# Patient Record
Sex: MALE | Race: WHITE | NOT HISPANIC OR LATINO | Employment: STUDENT | URBAN - METROPOLITAN AREA
[De-identification: names, ages, dates, MRNs, and addresses within clinical notes are randomized per-mention and may not be internally consistent; named-entity substitution may affect disease eponyms.]

---

## 2019-12-12 ENCOUNTER — OFFICE VISIT (OUTPATIENT)
Dept: URGENT CARE | Facility: CLINIC | Age: 24
End: 2019-12-12
Payer: COMMERCIAL

## 2019-12-12 VITALS
WEIGHT: 185 LBS | DIASTOLIC BLOOD PRESSURE: 67 MMHG | OXYGEN SATURATION: 100 % | HEIGHT: 68 IN | HEART RATE: 68 BPM | SYSTOLIC BLOOD PRESSURE: 127 MMHG | RESPIRATION RATE: 16 BRPM | TEMPERATURE: 98 F | BODY MASS INDEX: 28.04 KG/M2

## 2019-12-12 DIAGNOSIS — B02.9 HERPES ZOSTER WITHOUT COMPLICATION: Primary | ICD-10-CM

## 2019-12-12 PROCEDURE — 99203 OFFICE O/P NEW LOW 30 MIN: CPT | Performed by: PHYSICIAN ASSISTANT

## 2019-12-12 RX ORDER — FAMCICLOVIR 500 MG/1
500 TABLET, FILM COATED ORAL 3 TIMES DAILY
Qty: 21 TABLET | Refills: 0 | Status: SHIPPED | OUTPATIENT
Start: 2019-12-12 | End: 2019-12-12

## 2019-12-12 RX ORDER — MIRTAZAPINE 15 MG/1
TABLET, FILM COATED ORAL
Refills: 0 | COMMUNITY
Start: 2019-10-25

## 2019-12-12 RX ORDER — FAMCICLOVIR 500 MG/1
500 TABLET, FILM COATED ORAL 3 TIMES DAILY
Qty: 21 TABLET | Refills: 0 | Status: SHIPPED | OUTPATIENT
Start: 2019-12-12 | End: 2019-12-19

## 2019-12-12 RX ORDER — SERTRALINE HYDROCHLORIDE 100 MG/1
100 TABLET, FILM COATED ORAL DAILY
Refills: 1 | COMMUNITY
Start: 2019-10-05

## 2019-12-12 NOTE — PROGRESS NOTES
[facility]        NAME: Jan Alonzo is a 30 y o  male  : 1995    MRN: [id number]  DATE: 2019  TIME: 4:21 PM    Assessment and Plan   Herpes zoster without complication [H48 8]  1  Herpes zoster without complication  famciclovir (FAMVIR) 500 mg tablet     No ocular involvement  Patient Instructions     Take Famciclovir as prescribed  Keep affected area covered to avoid transmission   Take Ibuprofen and use lidocaine patches over the counter  Cool compresses over area  Follow up with PCP in 3-5 days  Proceed to  ER if symptoms worsen  Chief Complaint     Chief Complaint   Patient presents with    Herpes Zoster     has a herpes outbreak on his right forehead  Was dx Mat herpes 8 yrs ago         History of Present Illness        Admits to painful rash over R forehead  Patient states he has been more stressed recently due to finals  Review of Systems   Review of Systems   Constitutional: Negative for chills and fever  HENT: Negative for congestion, ear discharge, ear pain, mouth sores, rhinorrhea, sinus pressure, sinus pain, sneezing and sore throat  Respiratory: Negative for cough  Gastrointestinal: Negative for nausea and vomiting  Musculoskeletal: Negative for myalgias  Skin: Positive for rash  Neurological: Negative for headaches           Current Medications       Current Outpatient Medications:     mirtazapine (REMERON) 15 mg tablet, TAKE 1/2 TABLET BY MOUTH EVERY DAY AT BEDTIME, Disp: , Rfl: 0    sertraline (ZOLOFT) 100 mg tablet, Take 100 mg by mouth daily, Disp: , Rfl: 1    famciclovir (FAMVIR) 500 mg tablet, Take 1 tablet (500 mg total) by mouth 3 (three) times a day for 7 days, Disp: 21 tablet, Rfl: 0    Current Allergies     Allergies as of 2019 - never reviewed   Allergen Reaction Noted    E-mycin [erythromycin] GI Intolerance 2019            The following portions of the patient's history were reviewed and updated as appropriate: allergies, current medications, past family history, past medical history, past social history, past surgical history and problem list      Past Medical History:   Diagnosis Date    Anxiety        Past Surgical History:   Procedure Laterality Date    KNEE CARTILAGE SURGERY         Family History   Problem Relation Age of Onset    No Known Problems Mother     No Known Problems Father          Medications have been verified  Objective   /67   Pulse 68   Temp 98 °F (36 7 °C) (Tympanic)   Resp 16   Ht 5' 8" (1 727 m)   Wt 83 9 kg (185 lb)   SpO2 100%   BMI 28 13 kg/m²        Physical Exam     Physical Exam   Constitutional: He appears well-developed and well-nourished  No distress  HENT:   Mouth/Throat: Oropharynx is clear and moist    Cardiovascular: Normal rate, regular rhythm and normal heart sounds  Exam reveals no gallop and no friction rub  No murmur heard  Pulmonary/Chest: Effort normal and breath sounds normal  No respiratory distress  He has no wheezes  He has no rales  He exhibits no tenderness  Lymphadenopathy:     He has no cervical adenopathy  Neurological: He is alert  Skin: Skin is warm  He is not diaphoretic

## 2019-12-12 NOTE — PATIENT INSTRUCTIONS
Take Famciclovir as prescribed  Keep affected area covered to avoid transmission   Take Ibuprofen and use lidocaine patches over the counter  Cool compresses over area  Follow up with PCP in 3-5 days  Proceed to  ER if symptoms worsen  Shingles   WHAT YOU NEED TO KNOW:   Shingles is a painful infection caused by the same virus that causes chickenpox (varicella-zoster virus)  After you get chickenpox, the virus stays in your body for several years without causing any symptoms  Shingles occurs when the virus becomes active again  Once active, the virus will travel along a nerve to your skin and cause a rash  DISCHARGE INSTRUCTIONS:   Return to the emergency department if:   · You have painful, red, warm skin around the blisters, or the blisters drain pus  · Your neck is stiff or you have trouble moving it  · You have trouble moving your arms, legs, or face  · You have a seizure  · You have weakness in an arm or leg  · You become confused, or have difficulty speaking  · You have dizziness, a severe headache, hearing or vision loss  Contact your healthcare provider if:   · You feel weak or have a headache  · You have a cough, chills, or a fever  · You have abdominal pain, nausea, or vomiting  · Your rash becomes more itchy or painful  · Your rash spreads to other parts of your body  · Your pain worsens and does not go away even after taking medicine  · You have questions or concerns about your condition or care  Medicines:   · Antiviral medicine  helps decrease symptoms and healing time  They may also decrease your risk of developing nerve pain  You will need to start taking them within 3 days of the start of symptoms to prevent nerve pain  · Pain medicine  may be prescribed or suggested by your healthcare provider  You may need NSAIDs, acetaminophen, or opioid medicine depending on how much pain you are in   Do not wait until the pain is severe before you take more pain medicine  · Topical anesthetics  are used to numb the skin and decrease pain  They can be a cream, gel, spray, or patch  · Anticonvulsants  decrease nerve pain and may help you sleep at night  · Antidepressants  may be used to decrease nerve pain  · Take your medicine as directed  Contact your healthcare provider if you think your medicine is not helping or if you have side effects  Tell him of her if you are allergic to any medicine  Keep a list of the medicines, vitamins, and herbs you take  Include the amounts, and when and why you take them  Bring the list or the pill bottles to follow-up visits  Carry your medicine list with you in case of an emergency  Follow up with your healthcare provider as directed:  Write down your questions so you remember to ask them during your visits  Self-care:  Keep your rash clean and dry  Cover your rash with a bandage or clothing  Do not use bandages that stick to your skin  The sticky part may irritate your skin and make your rash last longer  Prevent the spread of shingles: The virus can be passed to a person who has never had chickenpox  This person may get chickenpox, but not shingles  You may pass the virus to others as long as you have a rash  The virus is spread by direct contact with the fluid from the blisters  Usually, you cannot spread the virus once the blisters dry up  Prevent shingles or another shingles outbreak:  A vaccine may be given to help prevent shingles  Ask for more information about this vaccine  © 2017 [employer] Information is for End User's use only and may not be sold, redistributed or otherwise used for commercial purposes  All illustrations and images included in CareNotes® are the copyrighted property of [employer]  or [employer]  The above information is an  only  It is not intended as medical advice for individual conditions or treatments   Talk to your doctor, nurse or pharmacist before following any medical regimen to see if it is safe and effective for you

## 2020-02-13 ENCOUNTER — OFFICE VISIT (OUTPATIENT)
Dept: URGENT CARE | Facility: CLINIC | Age: 25
End: 2020-02-13
Payer: COMMERCIAL

## 2020-02-13 VITALS
HEIGHT: 68 IN | DIASTOLIC BLOOD PRESSURE: 76 MMHG | WEIGHT: 185 LBS | SYSTOLIC BLOOD PRESSURE: 134 MMHG | TEMPERATURE: 98.9 F | BODY MASS INDEX: 28.04 KG/M2 | HEART RATE: 87 BPM | OXYGEN SATURATION: 95 % | RESPIRATION RATE: 16 BRPM

## 2020-02-13 DIAGNOSIS — B34.9 VIRAL ILLNESS: Primary | ICD-10-CM

## 2020-02-13 PROCEDURE — 99214 OFFICE O/P EST MOD 30 MIN: CPT | Performed by: PHYSICIAN ASSISTANT

## 2020-02-13 RX ORDER — ONDANSETRON 4 MG/1
4 TABLET, ORALLY DISINTEGRATING ORAL EVERY 6 HOURS PRN
Qty: 20 TABLET | Refills: 0 | Status: SHIPPED | OUTPATIENT
Start: 2020-02-13

## 2020-02-13 RX ORDER — METHYLPREDNISOLONE 4 MG/1
TABLET ORAL
Qty: 1 EACH | Refills: 0 | Status: SHIPPED | OUTPATIENT
Start: 2020-02-13

## 2020-02-13 NOTE — PROGRESS NOTES
[facility]        NAME: Radha Galindo is a 30 y o  male  : 1995    MRN: [id number]  DATE: 2020  TIME: 4:03 PM    Assessment and Plan   Viral illness [B34 9]  1  Viral illness  ondansetron (ZOFRAN-ODT) 4 mg disintegrating tablet    methylPREDNISolone 4 MG tablet therapy pack       Patient Instructions     Take medicine as discussed  Follow up with PCP in 3-5 days  Proceed to  ER if symptoms worsen  Chief Complaint     Chief Complaint   Patient presents with    Generalized Body Aches     c/o headache that started yesterday, also with congestion, generalized body aches and chills x3 days         History of Present Illness       URI    This is a new problem  Episode onset: 3 days  The problem has been unchanged  Maximum temperature: tactile  Associated symptoms include congestion, coughing, nausea, rhinorrhea, sinus pain, a sore throat and swollen glands  Pertinent negatives include no abdominal pain, chest pain, diarrhea, ear pain or wheezing  He has tried acetaminophen and decongestant for the symptoms  The treatment provided mild relief  Review of Systems   Review of Systems   Constitutional: Negative for activity change, appetite change, chills, diaphoresis, fatigue, fever and unexpected weight change  HENT: Positive for congestion, rhinorrhea, sinus pain and sore throat  Negative for ear pain  Respiratory: Positive for cough and chest tightness  Negative for apnea, choking, shortness of breath, wheezing and stridor  Cardiovascular: Negative for chest pain, palpitations and leg swelling  Gastrointestinal: Positive for nausea  Negative for abdominal pain and diarrhea           Current Medications       Current Outpatient Medications:     mirtazapine (REMERON) 15 mg tablet, TAKE 1/2 TABLET BY MOUTH EVERY DAY AT BEDTIME, Disp: , Rfl: 0    sertraline (ZOLOFT) 100 mg tablet, Take 100 mg by mouth daily, Disp: , Rfl: 1    famciclovir (FAMVIR) 500 mg tablet, Take 1 tablet (500 mg total) by mouth 3 (three) times a day for 7 days, Disp: 21 tablet, Rfl: 0    methylPREDNISolone 4 MG tablet therapy pack, Use as directed on package, Disp: 1 each, Rfl: 0    ondansetron (ZOFRAN-ODT) 4 mg disintegrating tablet, Take 1 tablet (4 mg total) by mouth every 6 (six) hours as needed for nausea or vomiting, Disp: 20 tablet, Rfl: 0    Current Allergies     Allergies as of 02/13/2020 - Reviewed 02/13/2020   Allergen Reaction Noted    E-mycin [erythromycin] GI Intolerance 12/12/2019            The following portions of the patient's history were reviewed and updated as appropriate: allergies, current medications, past family history, past medical history, past social history, past surgical history and problem list      Past Medical History:   Diagnosis Date    Anxiety     H/O cold sores        Past Surgical History:   Procedure Laterality Date    KNEE CARTILAGE SURGERY         Family History   Problem Relation Age of Onset    No Known Problems Mother     No Known Problems Father          Medications have been verified  Objective   /76   Pulse 87   Temp 98 9 °F (37 2 °C) (Tympanic)   Resp 16   Ht 5' 8" (1 727 m)   Wt 83 9 kg (185 lb)   SpO2 95%   BMI 28 13 kg/m²        Physical Exam     Physical Exam   Constitutional: He appears well-developed and well-nourished  HENT:   Head: Normocephalic  Right Ear: External ear normal    Left Ear: External ear normal    Nose: Mucosal edema and rhinorrhea present  Mouth/Throat: No oropharyngeal exudate, posterior oropharyngeal edema, posterior oropharyngeal erythema or tonsillar abscesses  Cardiovascular: Normal rate, regular rhythm and normal heart sounds  Exam reveals no gallop and no friction rub  No murmur heard  Pulmonary/Chest: Effort normal and breath sounds normal  No stridor  No respiratory distress  He has no decreased breath sounds  He has no wheezes  He has no rhonchi  He has no rales     Harsh dry cough Abdominal: Soft  Bowel sounds are normal  He exhibits no distension and no mass  There is no tenderness  There is no guarding  Lymphadenopathy:     He has cervical adenopathy  Right cervical: Superficial cervical adenopathy present  Left cervical: Superficial cervical adenopathy present

## 2020-02-13 NOTE — LETTER
February 13, 2020     Patient: Margie Pallas   YOB: 1995   Date of Visit: 2/13/2020       To Whom it May Concern:    Margie Pallas was seen in my clinic on 2/13/2020  He may return to school on 02/17/2020 due to influenza       If you have any questions or concerns, please don't hesitate to call           Sincerely,          Abdoul Albert PA-C        CC: No Recipients

## 2022-08-11 ENCOUNTER — APPOINTMENT (OUTPATIENT)
Dept: LAB | Facility: CLINIC | Age: 27
End: 2022-08-11

## 2022-08-11 ENCOUNTER — OCCMED (OUTPATIENT)
Dept: URGENT CARE | Facility: CLINIC | Age: 27
End: 2022-08-11

## 2022-08-11 DIAGNOSIS — Z02.1 PHYSICAL EXAM, PRE-EMPLOYMENT: ICD-10-CM

## 2022-08-11 DIAGNOSIS — Z02.1 PHYSICAL EXAM, PRE-EMPLOYMENT: Primary | ICD-10-CM

## 2022-08-11 PROCEDURE — 86480 TB TEST CELL IMMUN MEASURE: CPT

## 2022-08-11 PROCEDURE — 86762 RUBELLA ANTIBODY: CPT

## 2022-08-11 PROCEDURE — 36415 COLL VENOUS BLD VENIPUNCTURE: CPT

## 2022-08-11 PROCEDURE — 90715 TDAP VACCINE 7 YRS/> IM: CPT

## 2022-08-11 PROCEDURE — 86787 VARICELLA-ZOSTER ANTIBODY: CPT

## 2022-08-11 PROCEDURE — 86765 RUBEOLA ANTIBODY: CPT

## 2022-08-11 PROCEDURE — 86735 MUMPS ANTIBODY: CPT

## 2022-08-11 NOTE — PROGRESS NOTES
This encounter was created for [facility] orders only   [address] [facility]        NAME: Bruno Schilling is a 27 y o  male  : 1995    MRN: [id number]  DATE: 2022  TIME: 1:13 PM    There were no vitals taken for this visit  Assessment and Plan   Physical exam, pre-employment [Z02 1]  1  Physical exam, pre-employment  Varicella zoster antibody, IgG    Rubella antibody, IgG    Rubeola antibody IgG    Mumps antibody, IgG    Quantiferon TB Gold Plus         Patient Instructions       Follow up with PCP in 3-5 days  Proceed to  ER if symptoms worsen  Chief Complaint   No chief complaint on file          History of Present Illness       HPI    Review of Systems   Review of Systems      Current Medications       Current Outpatient Medications:     famciclovir (FAMVIR) 500 mg tablet, Take 1 tablet (500 mg total) by mouth 3 (three) times a day for 7 days, Disp: 21 tablet, Rfl: 0    methylPREDNISolone 4 MG tablet therapy pack, Use as directed on package, Disp: 1 each, Rfl: 0    mirtazapine (REMERON) 15 mg tablet, TAKE 1/2 TABLET BY MOUTH EVERY DAY AT BEDTIME, Disp: , Rfl: 0    ondansetron (ZOFRAN-ODT) 4 mg disintegrating tablet, Take 1 tablet (4 mg total) by mouth every 6 (six) hours as needed for nausea or vomiting, Disp: 20 tablet, Rfl: 0    sertraline (ZOLOFT) 100 mg tablet, Take 100 mg by mouth daily, Disp: , Rfl: 1    Current Allergies     Allergies as of 2022 - Reviewed 2020   Allergen Reaction Noted    E-mycin [erythromycin] GI Intolerance 2019            The following portions of the patient's history were reviewed and updated as appropriate: allergies, current medications, past family history, past medical history, past social history, past surgical history and problem list      Past Medical History:   Diagnosis Date    Anxiety     H/O cold sores        Past Surgical History:   Procedure Laterality Date    KNEE CARTILAGE SURGERY         Family History   Problem Relation Age of Onset    No Known Problems Mother     No Known Problems Father          Medications have been verified  Objective   There were no vitals taken for this visit         Physical Exam     Physical Exam

## 2022-08-12 LAB
MEV IGG SER QL IA: NORMAL
MUV IGG SER QL IA: ABNORMAL
RUBV IGG SERPL IA-ACNC: 99.9 IU/ML
VZV IGG SER QL IA: NORMAL

## 2022-08-15 LAB
GAMMA INTERFERON BACKGROUND BLD IA-ACNC: 0.03 IU/ML
M TB IFN-G BLD-IMP: NEGATIVE
M TB IFN-G CD4+ BCKGRND COR BLD-ACNC: 0 IU/ML
M TB IFN-G CD4+ BCKGRND COR BLD-ACNC: 0 IU/ML
MITOGEN IGNF BCKGRD COR BLD-ACNC: 2.4 IU/ML

## 2024-06-06 ENCOUNTER — OFFICE VISIT (OUTPATIENT)
Dept: URGENT CARE | Facility: CLINIC | Age: 29
End: 2024-06-06
Payer: COMMERCIAL

## 2024-06-06 VITALS
SYSTOLIC BLOOD PRESSURE: 137 MMHG | HEART RATE: 60 BPM | TEMPERATURE: 98.2 F | DIASTOLIC BLOOD PRESSURE: 80 MMHG | RESPIRATION RATE: 18 BRPM | OXYGEN SATURATION: 96 %

## 2024-06-06 DIAGNOSIS — J01.11 ACUTE RECURRENT FRONTAL SINUSITIS: Primary | ICD-10-CM

## 2024-06-06 PROCEDURE — 99213 OFFICE O/P EST LOW 20 MIN: CPT | Performed by: PHYSICIAN ASSISTANT

## 2024-06-06 RX ORDER — AMOXICILLIN AND CLAVULANATE POTASSIUM 875; 125 MG/1; MG/1
1 TABLET, FILM COATED ORAL EVERY 12 HOURS SCHEDULED
Qty: 14 TABLET | Refills: 0 | Status: SHIPPED | OUTPATIENT
Start: 2024-06-06 | End: 2024-06-13

## 2024-06-06 NOTE — PROGRESS NOTES
[facility]        NAME: Pedro Roe is a 28 y.o. male  : 1995    MRN: [id number]  DATE: 2024  TIME: 11:23 AM    Assessment and Plan   Acute recurrent frontal sinusitis [J01.11]  1. Acute recurrent frontal sinusitis  amoxicillin-clavulanate (AUGMENTIN) 875-125 mg per tablet            Patient Instructions   There are no Patient Instructions on file for this visit.      Follow up with PCP in 3-5 days.  Proceed to  ER if symptoms worsen.    Chief Complaint     Chief Complaint   Patient presents with    Facial Pain         History of Present Illness       Patient presents the clinic for sinus pressure, nasal congestion, for approximately 2 days        Review of Systems   Review of Systems   Constitutional:  Negative for chills and fever.   HENT:  Positive for congestion, facial swelling, postnasal drip, sinus pressure and sinus pain. Negative for ear pain and sore throat.    Eyes:  Negative for pain and visual disturbance.   Respiratory:  Negative for cough and shortness of breath.    Cardiovascular:  Negative for chest pain and palpitations.   Gastrointestinal:  Negative for abdominal pain and vomiting.   Genitourinary:  Negative for dysuria and hematuria.   Musculoskeletal:  Negative for arthralgias and back pain.   Skin:  Negative for color change and rash.   Neurological:  Negative for seizures and syncope.   All other systems reviewed and are negative.        Current Medications       Current Outpatient Medications:     amoxicillin-clavulanate (AUGMENTIN) 875-125 mg per tablet, Take 1 tablet by mouth every 12 (twelve) hours for 7 days, Disp: 14 tablet, Rfl: 0    famciclovir (FAMVIR) 500 mg tablet, Take 1 tablet (500 mg total) by mouth 3 (three) times a day for 7 days, Disp: 21 tablet, Rfl: 0    mirtazapine (REMERON) 15 mg tablet, TAKE 1/2 TABLET BY MOUTH EVERY DAY AT BEDTIME, Disp: , Rfl: 0    sertraline (ZOLOFT) 100 mg tablet, Take 100 mg by mouth daily, Disp: , Rfl: 1    Current  Allergies     Allergies as of 06/06/2024 - Reviewed 06/06/2024   Allergen Reaction Noted    E-mycin [erythromycin] GI Intolerance 12/12/2019            The following portions of the patient's history were reviewed and updated as appropriate: allergies, current medications, past family history, past medical history, past social history, past surgical history and problem list.     Past Medical History:   Diagnosis Date    Anxiety     H/O cold sores        Past Surgical History:   Procedure Laterality Date    KNEE CARTILAGE SURGERY         Family History   Problem Relation Age of Onset    No Known Problems Mother     No Known Problems Father          Medications have been verified.        Objective   /80   Pulse 60   Temp 98.2 °F (36.8 °C)   Resp 18   SpO2 96%        Physical Exam     Physical Exam  Constitutional:       Appearance: He is well-developed.   HENT:      Head: Normocephalic.      Nose: Congestion and rhinorrhea present.      Comments: Nasal congestion and green discharge is noted  Eyes:      General:         Left eye: No discharge.      Pupils: Pupils are equal, round, and reactive to light.   Neck:      Thyroid: No thyromegaly.      Trachea: No tracheal deviation.   Cardiovascular:      Rate and Rhythm: Normal rate and regular rhythm.      Heart sounds: No murmur heard.  Pulmonary:      Effort: Pulmonary effort is normal. No respiratory distress.      Breath sounds: Rhonchi present. No wheezing or rales.   Chest:      Chest wall: No tenderness.   Abdominal:      General: Bowel sounds are normal. There is no distension.      Palpations: Abdomen is soft. There is no mass.      Tenderness: There is no abdominal tenderness. There is no guarding or rebound.   Musculoskeletal:         General: Normal range of motion.      Cervical back: Normal range of motion.   Skin:     General: Skin is warm.   Neurological:      Mental Status: He is alert.

## 2025-05-11 ENCOUNTER — HOSPITAL ENCOUNTER (EMERGENCY)
Facility: HOSPITAL | Age: 30
Discharge: HOME | End: 2025-05-11
Attending: EMERGENCY MEDICINE
Payer: COMMERCIAL

## 2025-05-11 VITALS
BODY MASS INDEX: 29.83 KG/M2 | DIASTOLIC BLOOD PRESSURE: 91 MMHG | OXYGEN SATURATION: 98 % | TEMPERATURE: 97.2 F | RESPIRATION RATE: 16 BRPM | SYSTOLIC BLOOD PRESSURE: 141 MMHG | WEIGHT: 196.21 LBS | HEART RATE: 66 BPM

## 2025-05-11 DIAGNOSIS — S61.412A LACERATION OF LEFT HAND WITHOUT FOREIGN BODY, INITIAL ENCOUNTER: Primary | ICD-10-CM

## 2025-05-11 PROCEDURE — 99283 EMERGENCY DEPT VISIT LOW MDM: CPT

## 2025-05-11 PROCEDURE — 12001 RPR S/N/AX/GEN/TRNK 2.5CM/<: CPT | Performed by: EMERGENCY MEDICINE

## 2025-05-11 PROCEDURE — 90715 TDAP VACCINE 7 YRS/> IM: CPT | Performed by: EMERGENCY MEDICINE

## 2025-05-11 PROCEDURE — 90471 IMMUNIZATION ADMIN: CPT

## 2025-05-11 PROCEDURE — 99284 EMERGENCY DEPT VISIT MOD MDM: CPT | Performed by: EMERGENCY MEDICINE

## 2025-05-11 RX ADMIN — TETANUS TOXOID, REDUCED DIPHTHERIA TOXOID AND ACELLULAR PERTUSSIS VACCINE, ADSORBED 0.5 ML: 5; 2.5; 8; 8; 2.5 SUSPENSION INTRAMUSCULAR at 16:54

## 2025-05-11 NOTE — DISCHARGE INSTRUCTIONS
Your laceration was repaired with 2 stitches.  These need to be removed in 7-10 days.  Plan to follow-up with your primary doctor.    Watch for any signs of infection such as swelling, worsening pain or redness around the wound.  Return to the ER if any of these develop.    You were given tetanus today in the ER.

## 2025-05-11 NOTE — ED PROVIDER NOTES
Time reflects when diagnosis was documented in both MDM as applicable and the Disposition within this note       Time User Action Codes Description Comment    5/11/2025  4:56 PM Andrade Adair Add [S61.412A] Laceration of left hand without foreign body, initial encounter           ED Disposition       ED Disposition   Discharge    Condition   Stable    Date/Time   Sun May 11, 2025  4:56 PM    Comment   Pedro Roe discharge to home/self care.                   Assessment & Plan       Medical Decision Making  Left hand laceration on the palmar aspect in the area of the second webspace.  Needs laceration repair and will investigate with ultrasound to rule out foreign body.    Plan:   -Lac repair  -US POC  -Tetanus     Problems Addressed:  Laceration of left hand without foreign body, initial encounter: acute illness or injury    Amount and/or Complexity of Data Reviewed  Radiology:      Details: POC US: Hand was placed in a water bath and examined with ultrasound probe.  There was no evidence of foreign body.    Risk  Prescription drug management.      Final Impression and Plan:  1. Laceration of left hand without foreign body, initial encounter     - No foreign body seen on POC ultrasound  - Laceration repaired with 2 sutures  -Tetanus given  - Okay for discharge            Medications   tetanus-diphtheria-acellular pertussis (BOOSTRIX) IM injection 0.5 mL (0.5 mL Intramuscular Given 5/11/25 1654)       ED Risk Strat Scores                    No data recorded        SBIRT 22yo+      Flowsheet Row Most Recent Value   Initial Alcohol Screen: US AUDIT-C     1. How often do you have a drink containing alcohol? 0 Filed at: 05/11/2025 1554   2. How many drinks containing alcohol do you have on a typical day you are drinking?  0 Filed at: 05/11/2025 1554   3a. Male UNDER 65: How often do you have five or more drinks on one occasion? 0 Filed at: 05/11/2025 1554   Audit-C Score 0 Filed at: 05/11/2025 1554   [place]: How many  times in the past year have you...    Used an illegal drug or used a prescription medication for non-medical reasons? Never Filed at: 05/11/2025 1554                            History of Present Illness       Chief Complaint   Patient presents with    Hand Injury     Pt states he was cutting down a tree and cut the palm of his left hand. Bleeding controlled. Unknown tetanus vaccine status       Past Medical History:   Diagnosis Date    Anxiety     H/O cold sores       Past Surgical History:   Procedure Laterality Date    KNEE CARTILAGE SURGERY        Family History   Problem Relation Age of Onset    No Known Problems Mother     No Known Problems Father       Social History     Tobacco Use    Smoking status: Never    Smokeless tobacco: Never   Substance Use Topics    Drug use: Never      E-Cigarette/Vaping      E-Cigarette/Vaping Substances      I have reviewed and agree with the history as documented.     Otherwise healthy 29-year-old male presenting to the ER with an isolated injury to the left hand.  Patient was using an electric hedge tremor and cut himself on the blade.  Patient has a open wound to the second webspace, palmar aspect.  Patient did clean the wound out prior to arrival.  No numbness or tingling noted.  Patient was also concerned that there might be foreign body from the tree in his hand.      Hand Injury      Review of Systems   Skin:  Positive for wound.   Neurological:  Negative for weakness and numbness.           Objective       ED Triage Vitals [05/11/25 1553]   Temperature Pulse Blood Pressure Respirations SpO2 Patient Position - Orthostatic VS   (!) 97.2 °F (36.2 °C) 66 141/91 16 98 % --      Temp Source Heart Rate Source BP Location FiO2 (%) Pain Score    Temporal -- -- -- 2      Vitals      Date and Time Temp Pulse SpO2 Resp BP Pain Score FACES Pain Rating User   05/11/25 1553 97.2 °F (36.2 °C) 66 98 % 16 141/91 2 -- MD            Physical Exam  Constitutional:       Appearance: Normal  appearance.   HENT:      Head: Atraumatic.      Mouth/Throat:      Mouth: Mucous membranes are moist.   Eyes:      Conjunctiva/sclera: Conjunctivae normal.   Pulmonary:      Effort: Pulmonary effort is normal.   Musculoskeletal:      Comments: LUE:     1 cm Y-shaped laceration to the palmar aspect of the left hand in the area of the second webspace.  Wound is deep through some fatty tissue.  No exposed tendons.    Motor exam: Tendons of all fingers individually examined including FDS and FDP tendons. No abnormality.  Extensor tendons intact.  Thumb with normal ROM.     No sensory changes. Good cap refill.    Skin:     General: Skin is warm and dry.   Neurological:      General: No focal deficit present.      Mental Status: He is alert.           Universal Protocol:  procedure performed by consultantConsent given by: patient  Laceration repair    Date/Time: 5/11/2025 4:55 PM    Performed by: Andrade KILLIAN MD  Authorized by: Andrade KILLIAN MD  Body area: upper extremity  Location details: left hand  Laceration length: 1 cm  Foreign bodies: no foreign bodies  Tendon involvement: none  Nerve involvement: none  Vascular damage: no  Anesthesia: local infiltration    Anesthesia:  Local Anesthetic: lidocaine 1% without epinephrine    Sedation:  Patient sedated: no      Wound Dehiscence:  Superficial Wound Dehiscence: simple closure      Procedure Details:  Irrigation solution: saline  Irrigation method: jet lavage  Amount of cleaning: extensive  Debridement: none  Skin closure: 3-0 nylon  Number of sutures: 2  Technique: simple  Approximation: close  Approximation difficulty: simple  Dressing: 4x4 sterile gauze, antibiotic ointment and gauze roll  Patient tolerance: patient tolerated the procedure well with no immediate complications          ED Medication and Procedure Management   Prior to Admission Medications   Prescriptions Last Dose Informant Patient Reported? Taking?   famciclovir (FAMVIR) 500 mg tablet   No No   Sig:  Take 1 tablet (500 mg total) by mouth 3 (three) times a day for 7 days   mirtazapine (REMERON) 15 mg tablet   Yes No   Sig: TAKE 1/2 TABLET BY MOUTH EVERY DAY AT BEDTIME   sertraline (ZOLOFT) 100 mg tablet   Yes No   Sig: Take 100 mg by mouth daily      Facility-Administered Medications: None     Discharge Medication List as of 5/11/2025  4:58 PM        CONTINUE these medications which have NOT CHANGED    Details   famciclovir (FAMVIR) 500 mg tablet Take 1 tablet (500 mg total) by mouth 3 (three) times a day for 7 days, Starting Thu 12/12/2019, Until Thu 12/19/2019, Normal      mirtazapine (REMERON) 15 mg tablet TAKE 1/2 TABLET BY MOUTH EVERY DAY AT BEDTIME, Historical Med      sertraline (ZOLOFT) 100 mg tablet Take 100 mg by mouth daily, Starting Sat 10/5/2019, Historical Med           No discharge procedures on file.  ED SEPSIS DOCUMENTATION   Time reflects when diagnosis was documented in both MDM as applicable and the Disposition within this note       Time User Action Codes Description Comment    5/11/2025  4:56 PM Andrade Adair Add [S61.412A] Laceration of left hand without foreign body, initial encounter                  Andrade KILLIAN MD  05/11/25 1656       Andrade KILLIAN MD  05/11/25 1935       Andrade KILLIAN MD  05/12/25 1057